# Patient Record
Sex: MALE | Race: WHITE | URBAN - NONMETROPOLITAN AREA
[De-identification: names, ages, dates, MRNs, and addresses within clinical notes are randomized per-mention and may not be internally consistent; named-entity substitution may affect disease eponyms.]

---
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
## 2023-05-18 ENCOUNTER — OFFICE VISIT (OUTPATIENT)
Dept: FAMILY MEDICINE CLINIC | Age: 18
End: 2023-05-18

## 2023-05-18 VITALS
HEIGHT: 67 IN | SYSTOLIC BLOOD PRESSURE: 124 MMHG | WEIGHT: 125 LBS | DIASTOLIC BLOOD PRESSURE: 68 MMHG | OXYGEN SATURATION: 98 % | BODY MASS INDEX: 19.62 KG/M2 | RESPIRATION RATE: 20 BRPM | HEART RATE: 80 BPM | TEMPERATURE: 97.8 F

## 2023-05-18 DIAGNOSIS — J06.9 VIRAL URI: Primary | ICD-10-CM

## 2023-05-18 PROCEDURE — 99212 OFFICE O/P EST SF 10 MIN: CPT | Performed by: NURSE PRACTITIONER

## 2023-05-18 NOTE — PROGRESS NOTES
Subjective:  Chief Complaint   Patient presents with    Congestion       HPI:   Patient states on Monday of this week he had fever and upper respiratory symptoms. He has been taking over-the-counter medications with improvement, and at this point he feels symptoms are resolved but he needs a note to return to school. ROS:  Positive and pertinent negatives as per HPI. All other systems are reviewed and negative. No current outpatient medications on file. No Known Allergies     Objective:  Vitals:    05/18/23 1522   BP: 124/68   Pulse: 80   Resp: 20   Temp: 97.8 °F (36.6 °C)   TempSrc: Temporal   SpO2: 98%   Weight: 125 lb (56.7 kg)   Height: 5' 7\" (1.702 m)        Exam:  Const: Appears healthy and well developed. No signs of acute distress present. Vitals reviewed per triage. Head/Face: Normocephalic, atraumatic. Facies is symmetric. Eyes: PERRL. ENMT: Tympanic membranes are pearly gray with good light reflex bilaterally. Nares are patent with clear rhinorrhea. Buccal mucosa is moist.  No erythema in the posterior pharynx without edema of oropharynx or petechiae of palate. Neck: Supple and symmetric. Palpation reveals no adenopathy. No meningeal signs. Trachea midline. Resp: Lungs are clear to auscultation bilaterally without wheezes, rhonchi, or crackles. Chest expansion was symmetrical without accessory muscle use noted. CV: S1 is normal. S2 is normal.  Musculo: Patient moves extremities without pain or limitation. Pulses are equal bilaterally. Skin: Skin is warm and dry. Neuro: Alert and oriented x3. Speech is articulate and fluent. Psych: Mood and affect are appropriate to situation. Work note is given. No medications needed at this time. Tracee Barr was seen today for congestion.     Diagnoses and all orders for this visit:    Viral URI           Seen By:    WHITNEY Flores - KIMBERLY